# Patient Record
Sex: MALE | Race: WHITE | URBAN - METROPOLITAN AREA
[De-identification: names, ages, dates, MRNs, and addresses within clinical notes are randomized per-mention and may not be internally consistent; named-entity substitution may affect disease eponyms.]

---

## 2020-11-23 NOTE — PROGRESS NOTES
HISTORY OF PRESENT ILLNESS  Albert Worley is a 54 y.o. male. HPI     Pt is here to establish care. This is an established visit completed with telemedicine was completed with video assist  the patient acknowledges and agrees to this method of visitation doxyme    BP is controlled, no recent checks    Wt is 195 lbs per pt     Ordered labs    He saw a Dr. Idalia Manning (derm) 11/20 for basal cell   He had a biopsy done and is waiting for this   Discussed keeping on top of skin checks in future   He will be f/u 1/21     Meds: none     PMHx: none     FMHx: dad- Myelodysplasia, mom-htn, arrhythmia, arthritis    PSHx: none    SHx: drinks alcohol 4-5x a week, doesn't smoke, , 2 kids, works for [employer]     PREVENTIVE:  Colonoscopy: not yet needed  Tdap: >10 years ago, will give in clinic if doesn't get in pharmacy   Pneumovax: not yet needed  Shingrix: not yet needed  Flu shot: declines  Eye exam: 2-3 years ago  Lipids: ordered       There are no active problems to display for this patient. History reviewed. No pertinent surgical history. No results found for: WBC, WBCT, WBCPOC, HGB, HGBPOC, HCT, HCTPOC, PLT, PLTPOC, MCV, MCVPOC, HGBEXT, HCTEXT, PLTEXT, HGBEXT, HCTEXT, PLTEXT  No results found for: CHOL, CHOLPOCT, HDL, LDL, LDLC, LDLCPOC, LDLCEXT, TRIGL, TGLPOCT, CHHD, CHHDX  No results found for: GFRNA, GFRNAPOC, GFRAA, GFRAAPOC, CREA, CREAPOC, BUN, IBUN, BUNPOC, NA, NAPOC, K, KPOCT, CL, CLPOC, CO2, CO2POC, MG, PHOS, ALBEU, PTH, PTHILT, EPO     Review of Systems   Constitutional: Negative for chills and fever. HENT: Negative for hearing loss and tinnitus. Eyes: Negative for blurred vision and double vision. Respiratory: Negative for shortness of breath and wheezing. Cardiovascular: Negative for chest pain and palpitations. Gastrointestinal: Negative for nausea and vomiting. Genitourinary: Negative for dysuria and frequency. Musculoskeletal: Negative for back pain, falls and joint pain. Skin: Negative for itching and rash. Neurological: Negative for dizziness, loss of consciousness and headaches. Psychiatric/Behavioral: Negative for depression. The patient is not nervous/anxious. Physical Exam  Constitutional:       General: He is not in acute distress. Appearance: Normal appearance. He is not ill-appearing, toxic-appearing or diaphoretic. HENT:      Head: Normocephalic and atraumatic. Eyes:      General:         Right eye: No discharge. Left eye: No discharge. Conjunctiva/sclera: Conjunctivae normal.   Pulmonary:      Effort: No respiratory distress. Neurological:      Mental Status: He is alert and oriented to person, place, and time. Mental status is at baseline. Gait: Gait normal.   Psychiatric:         Mood and Affect: Mood normal.         Behavior: Behavior normal.         ASSESSMENT and PLAN    ICD-10-CM ICD-9-CM    1. Physical exam, annual         Declines flu shot    Due for Tdap will get this at local pharmacy    Due for labs ordered        We will need to get blood pressure when able no history of elevated blood pressure    Due for eye exam he will schedule         Z00.00 V70.0 LIPID PANEL      METABOLIC PANEL, COMPREHENSIVE      CBC W/O DIFF      TSH 3RD GENERATION      PSA, DIAGNOSTIC (PROSTATE SPECIFIC AG)   2. Basal cell carcinoma (BCC) of skin of face, unspecified part of face --following with dermatology will be getting Mohs surgery C44.310 173.31            Scribed by Júnior Mcgovern, as dictated by Dr. Paige Neal. Current diagnosis and concerns discussed with pt at length. Pt understands risks and benefits or current treatment plan and medications, and accepts the treatment and medication with any possible risks. Pt asks appropriate questions, which were answered. Pt was instructed to call with any concerns or problems. I have reviewed the note documented by the scribe. The services provided are my own.   The documentation is accurate     Albert Worley, who was evaluated through a synchronous (real-time) audio-video encounter, and/or his healthcare decision maker, is aware that it is a billable service, with coverage as determined by his insurance carrier. He provided verbal consent to proceed: Yes, and patient identification was verified. It was conducted pursuant to the emergency declaration under the [address] [place] [address], [address] [place] [address] authority and the [employer] Act. A caregiver was present when appropriate. Ability to conduct physical exam was limited. I was at home. The patient was at home.

## 2020-11-30 ENCOUNTER — VIRTUAL VISIT (OUTPATIENT)
Dept: INTERNAL MEDICINE CLINIC | Age: 45
End: 2020-11-30
Payer: COMMERCIAL

## 2020-11-30 DIAGNOSIS — Z00.00 PHYSICAL EXAM, ANNUAL: Primary | ICD-10-CM

## 2020-11-30 DIAGNOSIS — C44.310 BASAL CELL CARCINOMA (BCC) OF SKIN OF FACE, UNSPECIFIED PART OF FACE: ICD-10-CM

## 2020-11-30 PROCEDURE — 99203 OFFICE O/P NEW LOW 30 MIN: CPT | Performed by: INTERNAL MEDICINE

## 2021-01-15 LAB
ALBUMIN SERPL-MCNC: 4.7 G/DL (ref 4–5)
ALBUMIN/GLOB SERPL: 2 {RATIO} (ref 1.2–2.2)
ALP SERPL-CCNC: 76 IU/L (ref 39–117)
ALT SERPL-CCNC: 35 IU/L (ref 0–44)
AST SERPL-CCNC: 18 IU/L (ref 0–40)
BILIRUB SERPL-MCNC: 0.6 MG/DL (ref 0–1.2)
BUN SERPL-MCNC: 20 MG/DL (ref 6–24)
BUN/CREAT SERPL: 19 (ref 9–20)
CALCIUM SERPL-MCNC: 9.6 MG/DL (ref 8.7–10.2)
CHLORIDE SERPL-SCNC: 103 MMOL/L (ref 96–106)
CHOLEST SERPL-MCNC: 240 MG/DL (ref 100–199)
CO2 SERPL-SCNC: 27 MMOL/L (ref 20–29)
CREAT SERPL-MCNC: 1.05 MG/DL (ref 0.76–1.27)
ERYTHROCYTE [DISTWIDTH] IN BLOOD BY AUTOMATED COUNT: 12.4 % (ref 11.6–15.4)
GLOBULIN SER CALC-MCNC: 2.3 G/DL (ref 1.5–4.5)
GLUCOSE SERPL-MCNC: 100 MG/DL (ref 65–99)
HCT VFR BLD AUTO: 40.7 % (ref 37.5–51)
HDLC SERPL-MCNC: 50 MG/DL
HGB BLD-MCNC: 13.9 G/DL (ref 13–17.7)
LDLC SERPL CALC-MCNC: 161 MG/DL (ref 0–99)
MCH RBC QN AUTO: 30.5 PG (ref 26.6–33)
MCHC RBC AUTO-ENTMCNC: 34.2 G/DL (ref 31.5–35.7)
MCV RBC AUTO: 90 FL (ref 79–97)
PLATELET # BLD AUTO: 216 X10E3/UL (ref 150–450)
POTASSIUM SERPL-SCNC: 4.7 MMOL/L (ref 3.5–5.2)
PROT SERPL-MCNC: 7 G/DL (ref 6–8.5)
PSA SERPL-MCNC: 0.6 NG/ML (ref 0–4)
RBC # BLD AUTO: 4.55 X10E6/UL (ref 4.14–5.8)
SODIUM SERPL-SCNC: 141 MMOL/L (ref 134–144)
TRIGL SERPL-MCNC: 161 MG/DL (ref 0–149)
TSH SERPL DL<=0.005 MIU/L-ACNC: 2.27 UIU/ML (ref 0.45–4.5)
VLDLC SERPL CALC-MCNC: 29 MG/DL (ref 5–40)
WBC # BLD AUTO: 6.9 X10E3/UL (ref 3.4–10.8)

## 2021-01-15 NOTE — PROGRESS NOTES
Please call patient back about results  Add a1c    Let pt know mild lipid and bs elevation work on diet wt loss, will monitor

## 2021-01-20 ENCOUNTER — TELEPHONE (OUTPATIENT)
Dept: INTERNAL MEDICINE CLINIC | Age: 46
End: 2021-01-20

## 2021-01-20 NOTE — TELEPHONE ENCOUNTER
----- Message from Ernst Pedro sent at 1/20/2021  3:50 PM EST -----  Regarding: Dr. Dale Noonan  Contact: [phone]  Patient return call    Caller's first and last name and relationship (if not the patient): N/A      Best contact number(s):[phone]      Whose call is being returned: Kurt       Details to clarify the request: Missed call regarding lab results.       Message from [facility]

## 2021-01-20 NOTE — LETTER
1/26/2021 11:54 AM 
 
Mr. Meche Merino Croft [id number] Results for orders placed or performed in visit on 11/30/20 LIPID PANEL Result Value Ref Range Cholesterol, total 240 (H) 100 - 199 mg/dL Triglyceride 161 (H) 0 - 149 mg/dL HDL Cholesterol 50 >39 mg/dL VLDL, calculated 29 5 - 40 mg/dL LDL, calculated 161 (H) 0 - 99 mg/dL METABOLIC PANEL, COMPREHENSIVE Result Value Ref Range Glucose 100 (H) 65 - 99 mg/dL BUN 20 6 - 24 mg/dL Creatinine 1.05 0.76 - 1.27 mg/dL GFR est non-AA 85 >59 mL/min/1.73 GFR est AA 98 >59 mL/min/1.73  
 BUN/Creatinine ratio 19 9 - 20 Sodium 141 134 - 144 mmol/L Potassium 4.7 3.5 - 5.2 mmol/L Chloride 103 96 - 106 mmol/L  
 CO2 27 20 - 29 mmol/L Calcium 9.6 8.7 - 10.2 mg/dL Protein, total 7.0 6.0 - 8.5 g/dL Albumin 4.7 4.0 - 5.0 g/dL GLOBULIN, TOTAL 2.3 1.5 - 4.5 g/dL A-G Ratio 2.0 1.2 - 2.2 Bilirubin, total 0.6 0.0 - 1.2 mg/dL Alk. phosphatase 76 39 - 117 IU/L  
 AST (SGOT) 18 0 - 40 IU/L  
 ALT (SGPT) 35 0 - 44 IU/L  
CBC W/O DIFF Result Value Ref Range WBC 6.9 3.4 - 10.8 x10E3/uL  
 RBC 4.55 4.14 - 5.80 x10E6/uL HGB 13.9 13.0 - 17.7 g/dL HCT 40.7 37.5 - 51.0 % MCV 90 79 - 97 fL  
 MCH 30.5 26.6 - 33.0 pg  
 MCHC 34.2 31.5 - 35.7 g/dL  
 RDW 12.4 11.6 - 15.4 % PLATELET 231 261 - 009 x10E3/uL TSH 3RD GENERATION Result Value Ref Range TSH 2.270 0.450 - 4.500 uIU/mL  
PSA, DIAGNOSTIC (PROSTATE SPECIFIC AG) Result Value Ref Range Prostate Specific Ag 0.6 0.0 - 4.0 ng/mL Your labs show mild lipid and blood sugar (glucose) elevation. Work on diet & weight loss. I will monitor this at this time. Please see patient information with this letter.   
 
Sincerely, 
 
 
Blank Moctezuma MD

## 2021-01-20 NOTE — PROGRESS NOTES
Add-on labs faxed to [facility].   Confirmation received. Called, left vm for pt to return call to office.

## 2021-01-22 NOTE — TELEPHONE ENCOUNTER
----- Message from Naima Gallegos sent at 1/22/2021 11:19 AM EST -----  Regarding: /telephone  Contact: [phone]  Patient return call    Caller's first and last name and relationship (if not the patient)N/A      Best contact number(s):[phone]       Whose call is being returned: Susanne Aranda regarding labs      Message from [facility]

## 2021-01-26 NOTE — TELEPHONE ENCOUNTER
Lab results reviewed with patient per Andre Price MD. Patient given an opportunity to ask questions, repeated information, and verbalized understanding. Results mailed to patient per request.     Documentation from 1/20/21 confirms add on request from ROXANNA Singh. [facility] link website did not have results. Called & spoke with a representative who states this was not received nor run. Samples held are past the 7 day period & A1c results not available for this reason.